# Patient Record
Sex: MALE | Race: WHITE | NOT HISPANIC OR LATINO | Employment: UNEMPLOYED | URBAN - NONMETROPOLITAN AREA
[De-identification: names, ages, dates, MRNs, and addresses within clinical notes are randomized per-mention and may not be internally consistent; named-entity substitution may affect disease eponyms.]

---
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
## 2025-01-07 ENCOUNTER — DOCUMENTATION (OUTPATIENT)
Dept: PSYCHIATRY | Facility: CLINIC | Age: 20
End: 2025-01-07
Payer: COMMERCIAL

## 2025-02-26 ENCOUNTER — OFFICE VISIT (OUTPATIENT)
Dept: PSYCHIATRY | Facility: CLINIC | Age: 20
End: 2025-02-26
Payer: COMMERCIAL

## 2025-02-26 VITALS
HEIGHT: 69 IN | WEIGHT: 172.6 LBS | HEART RATE: 77 BPM | SYSTOLIC BLOOD PRESSURE: 128 MMHG | OXYGEN SATURATION: 97 % | BODY MASS INDEX: 25.56 KG/M2 | DIASTOLIC BLOOD PRESSURE: 80 MMHG

## 2025-02-26 DIAGNOSIS — F90.2 ATTENTION DEFICIT HYPERACTIVITY DISORDER, COMBINED TYPE: Primary | ICD-10-CM

## 2025-02-26 DIAGNOSIS — Z79.899 MEDICATION MANAGEMENT: ICD-10-CM

## 2025-02-26 LAB
AMPHET+METHAMPHET UR QL: NEGATIVE
AMPHETAMINES UR QL: NEGATIVE
BARBITURATES UR QL SCN: NEGATIVE
BENZODIAZ UR QL SCN: NEGATIVE
BUPRENORPHINE SERPL-MCNC: NEGATIVE NG/ML
CANNABINOIDS SERPL QL: NEGATIVE
COCAINE UR QL: NEGATIVE
FENTANYL UR-MCNC: NEGATIVE NG/ML
METHADONE UR QL SCN: NEGATIVE
OPIATES UR QL: NEGATIVE
OXYCODONE UR QL SCN: NEGATIVE
PCP UR QL SCN: NEGATIVE
TRICYCLICS UR QL SCN: NEGATIVE

## 2025-02-26 PROCEDURE — 80307 DRUG TEST PRSMV CHEM ANLYZR: CPT

## 2025-02-26 RX ORDER — DEXTROAMPHETAMINE SACCHARATE, AMPHETAMINE ASPARTATE, DEXTROAMPHETAMINE SULFATE AND AMPHETAMINE SULFATE 2.5; 2.5; 2.5; 2.5 MG/1; MG/1; MG/1; MG/1
10 TABLET ORAL 2 TIMES DAILY
Qty: 60 TABLET | Refills: 0 | Status: SHIPPED | OUTPATIENT
Start: 2025-02-26 | End: 2025-03-28

## 2025-05-21 ENCOUNTER — OFFICE VISIT (OUTPATIENT)
Dept: PSYCHIATRY | Facility: CLINIC | Age: 20
End: 2025-05-21
Payer: COMMERCIAL

## 2025-05-21 VITALS
HEART RATE: 69 BPM | DIASTOLIC BLOOD PRESSURE: 60 MMHG | SYSTOLIC BLOOD PRESSURE: 100 MMHG | WEIGHT: 165 LBS | HEIGHT: 69 IN | BODY MASS INDEX: 24.44 KG/M2 | OXYGEN SATURATION: 100 %

## 2025-05-21 DIAGNOSIS — Z79.899 MEDICATION MANAGEMENT: ICD-10-CM

## 2025-05-21 DIAGNOSIS — F90.2 ATTENTION DEFICIT HYPERACTIVITY DISORDER, COMBINED TYPE: Primary | ICD-10-CM

## 2025-05-21 PROCEDURE — 80307 DRUG TEST PRSMV CHEM ANLYZR: CPT

## 2025-05-21 RX ORDER — DEXTROAMPHETAMINE SACCHARATE, AMPHETAMINE ASPARTATE MONOHYDRATE, DEXTROAMPHETAMINE SULFATE AND AMPHETAMINE SULFATE 3.75; 3.75; 3.75; 3.75 MG/1; MG/1; MG/1; MG/1
15 CAPSULE, EXTENDED RELEASE ORAL DAILY
Qty: 30 CAPSULE | Refills: 0 | Status: SHIPPED | OUTPATIENT
Start: 2025-05-21 | End: 2025-06-20
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
## 2025-07-09 ENCOUNTER — OFFICE VISIT (OUTPATIENT)
Dept: PSYCHIATRY | Facility: CLINIC | Age: 20
End: 2025-07-09
Payer: MEDICARE

## 2025-07-09 VITALS
SYSTOLIC BLOOD PRESSURE: 112 MMHG | HEIGHT: 69 IN | BODY MASS INDEX: 23.99 KG/M2 | DIASTOLIC BLOOD PRESSURE: 76 MMHG | WEIGHT: 162 LBS | HEART RATE: 64 BPM | OXYGEN SATURATION: 98 %

## 2025-07-09 DIAGNOSIS — F90.2 ATTENTION DEFICIT HYPERACTIVITY DISORDER, COMBINED TYPE: Primary | ICD-10-CM

## 2025-07-09 RX ORDER — IBUPROFEN 800 MG/1
800 TABLET, FILM COATED ORAL EVERY 8 HOURS PRN
COMMUNITY
Start: 2025-06-17

## 2025-07-09 RX ORDER — LORATADINE 10 MG/1
1 TABLET ORAL DAILY
COMMUNITY
Start: 2025-06-17

## 2025-07-09 RX ORDER — DEXTROAMPHETAMINE SACCHARATE, AMPHETAMINE ASPARTATE MONOHYDRATE, DEXTROAMPHETAMINE SULFATE AND AMPHETAMINE SULFATE 3.75; 3.75; 3.75; 3.75 MG/1; MG/1; MG/1; MG/1
15 CAPSULE, EXTENDED RELEASE ORAL DAILY
Qty: 30 CAPSULE | Refills: 0 | Status: SHIPPED | OUTPATIENT
Start: 2025-07-09 | End: 2025-08-08

## 2025-07-09 RX ORDER — DEXTROAMPHETAMINE SACCHARATE, AMPHETAMINE ASPARTATE, DEXTROAMPHETAMINE SULFATE AND AMPHETAMINE SULFATE 2.5; 2.5; 2.5; 2.5 MG/1; MG/1; MG/1; MG/1
10 TABLET ORAL DAILY
COMMUNITY
End: 2025-07-09 | Stop reason: DRUGHIGH

## 2025-07-09 NOTE — PROGRESS NOTES
Subjective     Mason Bocanegra is a 19 y.o. male who presents today for follow up    Chief Complaint:  ADHD     History of Present Illness:    Today is a follow-up visit.    Medication compliance: patient is compliant with medications, denies SE..   Patient reports he has been taking his medication more regularly. He reports only missing a dose once a week.  He feels that the current dose is helping better with focus and concentration.     He reports having problems with focus and concentration since he started school. He had a Maciel CPT-3 in October of 2020. Patient has a total of 6 atypical T-scores, which is associated with a high likelihood of having a disorder characterized by attention deficits, such as ADHD.  Patient's profile of scores and response pattern indicate he may have issues related to: Inattentiveness (strong indication), impulsivity (strong indication), sustained attention (strong indication), and vigilance (some indication).   He rates his ADHD symptoms at 5/10 with 10 being worst. He reports a significant improvement with taking the XL and taking it more regularly.     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     Details:  Depression is rated at 0/10, with 10 being the worst. PHQ-9 score: 2 (prior 0)  Patient denies significant depressed mood or anhedonia. on, decreased energy, and decreased concentration. Patient denies feeling hopeless, helpless, worthless, or powerless.      Anxiety is rated at 0/10, with 10 being the worst. KEN-7 score: 1 (prior 2)  Patient denies excessive anxiety, excessive worry, or difficulty controlling worry.   He states he has not been feeling worried about anything.     Patient denies having anxiety attacks     Sleep is good. He reports sleep 9-10  hours per night. He is able to fall asleep and stay asleep easily. He reports going to bed at 9 pm and getting up at 8 am   Nightmares: Denies     Appetite is good. Eating 3 or more meals per  day. He is drinking 2-3 caffienated sodas a day.     Patient denies SI/HI, A/V hallucinations, or delusions.    Alcohol use: no  Drug use: no  Marijuana use: no  Tobacco use: no    no acute physical or medical issues today.    The following portions of the patient's history were reviewed and updated as appropriate: allergies, current medications, past family history, past medical history, past social history, past surgical history and problem list.    Previous psychiatric medications include:   Antidepressants: None  Antianxiety: None  Antipsychotics: None  Mood stabilizers: None  ADHD: Adderall and Methylphenidate (Ritaline)    Past Psychiatric History:  He reports having problems with focus and concentration since he started school. He was first treated for ADHD in first grade.   Outpatient treatment: he reports getting treatment in [facility]  Diagnoses: ADHD, combined type    Past Medical History:  Past Medical History:   Diagnosis Date    ADHD     Anxiety        Social History:  Social History     Socioeconomic History    Marital status: Single    Number of children: 0    Highest education level: High school graduate   Tobacco Use    Smoking status: Never    Smokeless tobacco: Never   Vaping Use    Vaping status: Never Used   Substance and Sexual Activity    Alcohol use: Never    Drug use: Never    Sexual activity: Defer       Family History:  Family History   Problem Relation Age of Onset    Anxiety disorder Mother     Depression Mother     No Known Problems Father     ADD / ADHD Cousin        Past Surgical History:  Past Surgical History:   Procedure Laterality Date    MOUTH SURGERY  2008    Caps placed on teeth    TEETH EXTRACTION         Problem List:  Patient Active Problem List   Diagnosis    ADHD (attention deficit hyperactivity disorder), combined type       Allergy:   No Known Allergies     Current Medications:   Current Outpatient Medications   Medication Sig Dispense Refill     "Allergy Relief 10 MG tablet Take 1 tablet by mouth Daily.      FeroSul 325 (65 Fe) MG tablet Take 1 tablet by mouth Every 12 (Twelve) Hours.      ibuprofen (ADVIL,MOTRIN) 800 MG tablet Take 1 tablet by mouth Every 8 (Eight) Hours As Needed for Mild Pain.      amphetamine-dextroamphetamine XR (Adderall XR) 15 MG 24 hr capsule Take 1 capsule by mouth Daily for 30 days 30 capsule 0     No current facility-administered medications for this visit.       Review of Symptoms:    Review of Systems   Constitutional: Negative.    HENT: Negative.     Eyes: Negative.    Respiratory: Negative.     Cardiovascular: Negative.    Gastrointestinal: Negative.    Endocrine: Negative.    Genitourinary: Negative.    Musculoskeletal: Negative.    Skin: Negative.    Allergic/Immunologic: Negative.    Neurological: Negative.    Hematological: Negative.    Psychiatric/Behavioral:  Positive for decreased concentration and positive for hyperactivity.        Objective     Physical Exam:   Physical Exam  Constitutional:       Appearance: Normal appearance.   Cardiovascular:      Rate and Rhythm: Normal rate.   Pulmonary:      Effort: Pulmonary effort is normal.   Musculoskeletal:         General: Normal range of motion.      Cervical back: Normal range of motion.   Skin:     General: Skin is warm and dry.   Neurological:      General: No focal deficit present.      Mental Status: He is alert and oriented to person, place, and time.   Psychiatric:         Attention and Perception: Perception normal. He is inattentive.         Mood and Affect: Mood and affect normal.         Speech: Speech normal.         Behavior: Behavior normal. Behavior is cooperative.         Thought Content: Thought content normal.         Cognition and Memory: Cognition and memory normal.         Judgment: Judgment is impulsive.         Vitals:  Blood pressure 112/76, pulse 64, height 175.3 cm (69.02\"), weight 73.5 kg (162 lb), SpO2 98%.   Body mass index is 23.91 kg/m².  Wt " Readings from Last 3 Encounters:   07/09/25 73.5 kg (162 lb) (60%, Z= 0.24)*   05/21/25 74.8 kg (165 lb) (64%, Z= 0.37)*   02/26/25 78.3 kg (172 lb 9.6 oz) (74%, Z= 0.65)*     * Growth percentiles are based on [facility] (Boys, 2-20 Years) data.       Last 3 Blood Pressure and Pulse Readings:  BP Readings from Last 3 Encounters:   07/09/25 112/76   05/21/25 100/60   02/26/25 128/80     Pulse Readings from Last 3 Encounters:   07/09/25 64   05/21/25 69   02/26/25 77       PHQ-9 Score: July 9, 2025  Little interest or pleasure in doing things? Several days   Feeling down, depressed, or hopeless? Not at all   PHQ-2 Total Score 1   Trouble falling or staying asleep, or sleeping too much? Not at all   Feeling tired or having little energy? Not at all   Poor appetite or overeating? Not at all   Feeling bad about yourself - or that you are a failure or have let yourself or your family down? Not at all   Trouble concentrating on things, such as reading the newspaper or watching television? Several days   Moving or speaking so slowly that other people could have noticed? Or the opposite - being so fidgety or restless that you have been moving around a lot more than usual? Not at all     Thoughts that you would be better off dead, or of hurting yourself in some way? Not at all   PHQ-9 Total Score 2   If you checked off any problems, how difficult have these problems made it for you to do your work, take care of things at home, or get along with other people? Not difficult at all          KEN-7 Score: July 9, 2025  Feeling nervous, anxious or on edge: Not at all  Not being able to stop or control worrying: Not at all  Worrying too much about different things: Not at all  Trouble Relaxing: Not at all  Being so restless that it is hard to sit still: Several days  Feeling afraid as if something awful might happen: Not at all  Becoming easily annoyed or irritable: Not at all  KEN 7 Total Score: 1  If you checked any problems, how  difficult have these problems made it for you to do your work, take care of things at home, or get along with other people: Somewhat difficult     Appearance: patient is a 19 year old  male. He is casually dressed in shorts, a blue graphic ehsan shirt, and tennis shoes. He's reddish blond hair is clean. He has a well maintained beard. He is appropriately dressed for his age and the weather.   Gait, Station, Strength: WNL        Mental Status Exam:   Hygiene:   good  Cooperation:  Cooperative  Eye Contact:  Good  Psychomotor Behavior:  Restless  Affect: Appropriate   Mood: normal  Hopelessness: Denies  Speech:  Normal  Thought Process:  Goal directed  Thought Content:  Mood congruent  Suicidal:  None  Homicidal:  None  Hallucinations:  None  Delusion:  None  Memory:  Intact  Orientation:  Person, Place, Time, and Situation  Reliability:  good  Insight:  Fair  Judgement:  Fair  Impulse Control:  Fair  Physical/Medical Issues:  No        Lab Results:   Office Visit on 05/21/2025   Component Date Value Ref Range Status    THC, Screen, Urine 05/21/2025 Negative  Negative Final    Phencyclidine (PCP), Urine 05/21/2025 Negative  Negative Final    Cocaine Screen, Urine 05/21/2025 Negative  Negative Final    Methamphetamine, Ur 05/21/2025 Negative  Negative Final    Opiate Screen 05/21/2025 Negative  Negative Final    Amphetamine Screen, Urine 05/21/2025 Positive (A)  Negative Final    Benzodiazepine Screen, Urine 05/21/2025 Negative  Negative Final    Tricyclic Antidepressants Screen 05/21/2025 Negative  Negative Final    Methadone Screen, Urine 05/21/2025 Negative  Negative Final    Barbiturates Screen, Urine 05/21/2025 Negative  Negative Final    Oxycodone Screen, Urine 05/21/2025 Negative  Negative Final    Buprenorphine, Screen, Urine 05/21/2025 Negative  Negative Final    Fentanyl, Urine 05/21/2025 Negative  Negative Final         Assessment & Plan   Diagnoses and all orders for this visit:    1. Attention  deficit hyperactivity disorder, combined type (Primary)  -     amphetamine-dextroamphetamine XR (Adderall XR) 15 MG 24 hr capsule; Take 1 capsule by mouth Daily for 30 days  Dispense: 30 capsule; Refill: 0          Visit Diagnoses:    ICD-10-CM ICD-9-CM   1. Attention deficit hyperactivity disorder, combined type  F90.2 314.01         GOALS:  Short Term Goals: Patient will be compliant with medication, and patient will have no significant medication related side effects.  Patient will be engaged in psychotherapy as indicated.  Patient will report subjective improvement of symptoms.  Long term goals: To stabilize mood and treat/improve subjective symptoms, the patient will stay out of the hospital, the patient will be at an optimal level of functioning, and the patient will take all medications as prescribed.  The patient/guardian verbalized understanding and agreement with goals that were mutually set.      TREATMENT PLAN:   -Discontinue amphetamine-dextroamphetamine (Adderall) 10 mg p.o. 2 times a day for ADHD combined type symptoms.  -Start amphetamine-dextroamphetamine XR (Adderall XR)15 mg p.o. daily for ADHD combined type symptoms  -Discussed supportive psychotherapy efforts to develop coping skills to manage stress and emotions.   -Primary target symptoms for stimulant medication are concentration, attention span, motor hyperactivity, impulsiveness, physical and mental fatigue, daytime sleepiness and depression.    -Pharmacological and Non-Pharmacological treatment options discussed during today's visit.   -The benefits of a healthy diet and exercise were discussed with patient, especially the positive effects they have on mental health. Patient encouraged to consider lifestyle modification regarding  diet and exercise patterns to maximize results of mental health treatment.   -We discussed sleep hygiene including going to bed at the same time and getting up at the same time every day, decreased caffeine  consumption, going to bed early enough to get 7 or 8 hours in bed, reading and relaxing before bedtime, and avoiding stimulating activities close to bedtime.   -Patient acknowledged and verbally consented with current treatment plan and was educated on the importance of compliance with treatment and follow-up appointments.    -Return to clinic in 4 weeks for follow up.  -Reviewed previous available documentation  -Reviewed most recent available labs, ordered UDS  -NAVIN reviewed  -Controlled Substance Agreement signed: 2/26/25  -UDS: Positive amphetamines    MEDICATION ISSUES:  -Discussed medication options and treatment plan of prescribed medication as well as the risks, benefits, any black box warnings, and side effects.   -Medication options for treatment of ADHD discussed including Alpha 2 agonists, Strattera, Wellbutrin, Methylphenidate and Amphetamine options. Extensive education is provided regarding stimulants. Class warnings for stimulants: worsening or new onset of tics, growth inhibition or weight loss, cardiovascular risks including palpitations, tachycardia, hypertension and sudden death in patient with preexisting cardiac structural abnormalities.. Patient is being prescribed a controlled substance as part of treatment plan. Stimulants can not be refilled or called in and patient is subject to random drug testing to ensure compliance with medication. Patient and guardian have been educated of appropriate use of the medications and potential side effects of: weight loss, anorexia, dry mouth, abdominal pain, nausea, vomiting, insomnia, headache, nervousness, and potential for dependence. Controlled substance agreement obtained.Patient is also informed that the medication are to be used by the patient only- avoid any combined use of ETOH or other substances unless prescribed.   -Patient is agreeable to call the office with any worsening of symptoms or onset of side effects, or if any concerns or  questions arise.    -The contact information for the office is made available to the patient. Patient is agreeable to call 911 or go to the nearest ER should they begin having any SI/HI, or if any urgent concerns arise. No medication side effects or related complaints today.     MEDS ORDERED DURING VISIT:  New Medications Ordered This Visit   Medications    amphetamine-dextroamphetamine XR (Adderall XR) 15 MG 24 hr capsule     Sig: Take 1 capsule by mouth Daily for 30 days     Dispense:  30 capsule     Refill:  0       MEDS DISCONTINUED DURING VISIT:   Medications Discontinued During This Encounter   Medication Reason    amphetamine-dextroamphetamine (ADDERALL) 10 MG tablet Dose adjustment        Follow Up Appointment:   No follow-ups on file.           This document has been electronically signed by SONDRA Morris  July 9, 2025 13:00 EDT    Dictated Utilizing Dragon Dictation: Part of this note may be an electronic transcription/translation of spoken language to printed text using the Dragon Dictation System. Errors in dictation may reflect use of voice recognition software and not all errors in transcription may have been detected prior to signing.  History of Present Illness

## 2025-08-12 ENCOUNTER — TELEPHONE (OUTPATIENT)
Dept: FAMILY MEDICINE CLINIC | Facility: CLINIC | Age: 20
End: 2025-08-12
Payer: MEDICARE

## 2025-08-12 DIAGNOSIS — F90.2 ATTENTION DEFICIT HYPERACTIVITY DISORDER, COMBINED TYPE: Primary | ICD-10-CM

## 2025-08-12 RX ORDER — DEXTROAMPHETAMINE SACCHARATE, AMPHETAMINE ASPARTATE MONOHYDRATE, DEXTROAMPHETAMINE SULFATE AND AMPHETAMINE SULFATE 3.75; 3.75; 3.75; 3.75 MG/1; MG/1; MG/1; MG/1
15 CAPSULE, EXTENDED RELEASE ORAL DAILY
Qty: 30 CAPSULE | Refills: 0 | Status: SHIPPED | OUTPATIENT
Start: 2025-08-12 | End: 2025-09-11